# Patient Record
Sex: FEMALE | Race: WHITE | NOT HISPANIC OR LATINO | Employment: FULL TIME | URBAN - METROPOLITAN AREA
[De-identification: names, ages, dates, MRNs, and addresses within clinical notes are randomized per-mention and may not be internally consistent; named-entity substitution may affect disease eponyms.]

---

## 2017-03-13 ENCOUNTER — OFFICE VISIT (OUTPATIENT)
Dept: URGENT CARE | Facility: CLINIC | Age: 47
End: 2017-03-13

## 2017-03-13 PROCEDURE — 99213 OFFICE O/P EST LOW 20 MIN: CPT

## 2017-09-08 DIAGNOSIS — Z12.31 ENCOUNTER FOR SCREENING MAMMOGRAM FOR MALIGNANT NEOPLASM OF BREAST: ICD-10-CM

## 2017-09-08 DIAGNOSIS — N64.4 MASTODYNIA: ICD-10-CM

## 2017-10-06 ENCOUNTER — GENERIC CONVERSION - ENCOUNTER (OUTPATIENT)
Dept: OTHER | Facility: OTHER | Age: 47
End: 2017-10-06

## 2017-10-18 ENCOUNTER — GENERIC CONVERSION - ENCOUNTER (OUTPATIENT)
Dept: OTHER | Facility: OTHER | Age: 47
End: 2017-10-18

## 2017-10-18 LAB
A/G RATIO (HISTORICAL): 1.7 (ref 1.2–2.2)
ALBUMIN SERPL BCP-MCNC: 4.3 G/DL (ref 3.5–5.5)
ALP SERPL-CCNC: 86 IU/L (ref 39–117)
ALT SERPL W P-5'-P-CCNC: 8 IU/L (ref 0–32)
AST SERPL W P-5'-P-CCNC: 15 IU/L (ref 0–40)
BASOPHILS # BLD AUTO: 0 %
BASOPHILS # BLD AUTO: 0 X10E3/UL (ref 0–0.2)
BILIRUB SERPL-MCNC: 0.5 MG/DL (ref 0–1.2)
BUN SERPL-MCNC: 10 MG/DL (ref 6–24)
BUN/CREA RATIO (HISTORICAL): 13 (ref 9–23)
CALCIUM SERPL-MCNC: 8.9 MG/DL (ref 8.7–10.2)
CHLORIDE SERPL-SCNC: 101 MMOL/L (ref 96–106)
CHOLEST SERPL-MCNC: 132 MG/DL (ref 100–199)
CO2 SERPL-SCNC: 26 MMOL/L (ref 18–29)
CREAT SERPL-MCNC: 0.76 MG/DL (ref 0.57–1)
DEPRECATED RDW RBC AUTO: 13.8 % (ref 12.3–15.4)
EGFR AFRICAN AMERICAN (HISTORICAL): 107 ML/MIN/1.73
EGFR-AMERICAN CALC (HISTORICAL): 93 ML/MIN/1.73
EOSINOPHIL # BLD AUTO: 0.1 X10E3/UL (ref 0–0.4)
EOSINOPHIL # BLD AUTO: 2 %
GLUCOSE SERPL-MCNC: 93 MG/DL (ref 65–99)
HCT VFR BLD AUTO: 40.9 % (ref 34–46.6)
HDLC SERPL-MCNC: 54 MG/DL
HGB BLD-MCNC: 13.6 G/DL (ref 11.1–15.9)
IMM.GRANULOCYTES (CD4/8) (HISTORICAL): 0 %
IMM.GRANULOCYTES (CD4/8) (HISTORICAL): 0 X10E3/UL (ref 0–0.1)
LDLC SERPL CALC-MCNC: 63 MG/DL (ref 0–99)
LYMPHOCYTES # BLD AUTO: 2.4 X10E3/UL (ref 0.7–3.1)
LYMPHOCYTES # BLD AUTO: 27 %
MCH RBC QN AUTO: 28.8 PG (ref 26.6–33)
MCHC RBC AUTO-ENTMCNC: 33.3 G/DL (ref 31.5–35.7)
MCV RBC AUTO: 87 FL (ref 79–97)
MONOCYTES # BLD AUTO: 0.6 X10E3/UL (ref 0.1–0.9)
MONOCYTES (HISTORICAL): 7 %
NEUTROPHILS # BLD AUTO: 5.6 X10E3/UL (ref 1.4–7)
NEUTROPHILS # BLD AUTO: 64 %
PLATELET # BLD AUTO: 274 X10E3/UL (ref 150–379)
POTASSIUM SERPL-SCNC: 4.4 MMOL/L (ref 3.5–5.2)
RBC (HISTORICAL): 4.72 X10E6/UL (ref 3.77–5.28)
SODIUM SERPL-SCNC: 139 MMOL/L (ref 134–144)
TOT. GLOBULIN, SERUM (HISTORICAL): 2.5 G/DL (ref 1.5–4.5)
TOTAL PROTEIN (HISTORICAL): 6.8 G/DL (ref 6–8.5)
TRIGL SERPL-MCNC: 77 MG/DL (ref 0–149)
WBC # BLD AUTO: 8.8 X10E3/UL (ref 3.4–10.8)

## 2017-10-19 ENCOUNTER — ALLSCRIPTS OFFICE VISIT (OUTPATIENT)
Dept: OTHER | Facility: OTHER | Age: 47
End: 2017-10-19

## 2017-10-19 LAB
BACTERIA UR QL AUTO: NORMAL
BILIRUB UR QL STRIP: NEGATIVE
COLOR UR: YELLOW
COMMENT (HISTORICAL): CLEAR
FECAL OCCULT BLOOD DIAGNOSTIC (HISTORICAL): NEGATIVE
GLUCOSE (HISTORICAL): NEGATIVE
KETONES UR STRIP-MCNC: NEGATIVE MG/DL
LEUKOCYTE ESTERASE UR QL STRIP: NEGATIVE
MICROSCOPIC EXAMINATION (HISTORICAL): NORMAL
MICROSCOPIC EXAMINATION (HISTORICAL): NORMAL
MUCUS THREADS (HISTORICAL): PRESENT
NITRITE UR QL STRIP: NEGATIVE
NON-SQ EPI CELLS URNS QL MICRO: NORMAL /HPF
PH UR STRIP.AUTO: 6.5 [PH] (ref 5–7.5)
PROT UR STRIP-MCNC: NEGATIVE MG/DL
RBC (HISTORICAL): NORMAL /HPF
SP GR UR STRIP.AUTO: 1.02 (ref 1–1.03)
TSH SERPL DL<=0.05 MIU/L-ACNC: 1.74 UIU/ML (ref 0.45–4.5)
URINALYSIS (UA) (HISTORICAL): NORMAL
UROBILINOGEN UR QL STRIP.AUTO: 0.2 EU/DL (ref 0.2–1)
WBC # BLD AUTO: NORMAL /HPF

## 2017-10-23 ENCOUNTER — ALLSCRIPTS OFFICE VISIT (OUTPATIENT)
Dept: OTHER | Facility: OTHER | Age: 47
End: 2017-10-23

## 2017-10-23 DIAGNOSIS — E04.2 NONTOXIC MULTINODULAR GOITER: ICD-10-CM

## 2017-10-23 DIAGNOSIS — N28.1 ACQUIRED CYST OF KIDNEY: ICD-10-CM

## 2017-11-20 ENCOUNTER — HOSPITAL ENCOUNTER (OUTPATIENT)
Dept: ULTRASOUND IMAGING | Facility: CLINIC | Age: 47
Discharge: HOME | End: 2017-11-20
Payer: COMMERCIAL

## 2017-11-20 ENCOUNTER — HOSPITAL ENCOUNTER (OUTPATIENT)
Dept: MAMMOGRAPHY | Facility: CLINIC | Age: 47
Discharge: HOME | End: 2017-11-20
Payer: COMMERCIAL

## 2017-11-20 DIAGNOSIS — Z12.31 ENCOUNTER FOR SCREENING MAMMOGRAM FOR MALIGNANT NEOPLASM OF BREAST: ICD-10-CM

## 2017-11-20 DIAGNOSIS — N28.1 ACQUIRED CYST OF KIDNEY: ICD-10-CM

## 2017-11-20 DIAGNOSIS — N64.4 MASTODYNIA: ICD-10-CM

## 2017-11-20 DIAGNOSIS — E04.2 NONTOXIC MULTINODULAR GOITER: ICD-10-CM

## 2017-11-20 DIAGNOSIS — R92.8 ABNORMAL MAMMOGRAM OF LEFT BREAST: ICD-10-CM

## 2017-11-20 DIAGNOSIS — R92.8 ABNORMAL MAMMOGRAM OF RIGHT BREAST: ICD-10-CM

## 2017-11-20 PROCEDURE — 76642 ULTRASOUND BREAST LIMITED: CPT

## 2017-11-20 PROCEDURE — G0204 DX MAMMO INCL CAD BI: HCPCS

## 2017-11-20 PROCEDURE — 76536 US EXAM OF HEAD AND NECK: CPT

## 2017-11-20 PROCEDURE — 76770 US EXAM ABDO BACK WALL COMP: CPT

## 2017-11-20 PROCEDURE — G0279 TOMOSYNTHESIS, MAMMO: HCPCS

## 2017-11-24 ENCOUNTER — GENERIC CONVERSION - ENCOUNTER (OUTPATIENT)
Dept: OTHER | Facility: OTHER | Age: 47
End: 2017-11-24

## 2018-01-10 NOTE — MISCELLANEOUS
Message   Recorded as Task   Date: 11/22/2017 05:34 PM, Created By: Chris Mcintyre   Task Name: Go to Result   Assigned To: Kaci Salinas   Regarding Patient: Coleen Mendes, Status: Complete   Comment:    Chris Mcintyre - 22 Nov 2017 5:34 PM     TASK CREATED  Please let the patient know that there is no significant change in the thyroid US-  the nodeule is still too small to biopsy  We will plan on checking an US again in a year  Kaci Salinas - 24 Nov 2017 1:31 PM     TASK EDITED  patient informed at home number   Kaci Salinas - 24 Nov 2017 1:31 PM     TASK COMPLETED        Active Problems    1  Breast pain, left (611 71) (N64 4)   2  Herpes labialis (054 9) (B00 1)   3  Multiple thyroid nodules (241 1) (E04 2)   4  Neck pain on right side (723 1) (M54 2)   5  Occipital headache (784 0) (R51)   6  Renal cyst, acquired (593 2) (N28 1)    Current Meds   1  Multivitamins CHEW; CHEW AND SWALLOW 1 TABLET DAILY; Therapy: (Recorded:23Oct2017) to Recorded   2  Vitamin D3 1000 UNIT Oral Tablet Chewable; CHEW 1 TABLET DAILY; Therapy: (Recorded:23Oct2017) to Recorded    Allergies    1   Augmentin XR TB12    Signatures   Electronically signed by : Brandie Pepper, ; Nov 24 2017  1:31PM EST                       (Author)

## 2018-01-17 NOTE — PROGRESS NOTES
Assessment    1  Encounter for preventive health examination (V70 0) (Z00 00)    Plan  Breast pain, left, PMH: Screening for breast cancer    · MAMMO DIAGNOSTIC BILATERAL W CAD; Status:Active; Requested [id number]; Health Maintenance    · Begin a limited exercise program ; Status:Complete;   Done: 89AYJ2272   · Begin or continue regular aerobic exercise  Gradually work up to at least 3 sessions of 30  minutes of exercise a week ; Status:Complete;   Done: 82SZU9217   · Eat a low fat and low cholesterol diet ; Status:Complete;   Done: 25JTM7990   · Stretch and warm up your muscles during the first 10 minutes , then cool down your  muscles for the last 10 minutes of exercise ; Status:Complete;   Done: 11LPL8690   · We recommend that you bring your body mass index down to 26 ; Status:Complete;    Done: 76Eiq6588   · You need to quit smoking ; Status:Complete;   Done: 30KAD5189   · Follow-up visit in 1 year Evaluation and Treatment  Follow-up  Status: Complete  Done:  23Oct2017  Multiple thyroid nodules    · US THYROID; Status:Active; Requested [id number];   Renal cyst, acquired    · [address] [place]; Status:Active; Requested ZAO:[id number];     Discussion/Summary  health maintenance visit Currently, she eats a healthy diet and has an adequate exercise regimen  the risks and benefits of cervical cancer screening were discussed cervical cancer screening is current Breast cancer screening: the risks and benefits of breast cancer screening were discussed, self breast exam technique was taught and mammogram has been ordered  She was advised to be evaluated by an optometrist and a dentist  Advice and education were given regarding nutrition, weight bearing exercise, weight loss, vitamin D supplements, sunscreen use, self skin examination, seat belt use and advanced directive planning  The patient's lab work looked very good  She will work on her diet and exercise   We will see her again in a year for a physical    Possible side effects of new medications were reviewed with the patient/guardian today  The treatment plan was reviewed with the patient/guardian  The patient/guardian understands and agrees with the treatment plan      Chief Complaint  Complete Physical 48year old  History of Present Illness  HM, Adult Female: The patient is being seen for a health maintenance evaluation  General Health: The patient's health since the last visit is described as good  Lifestyle:  She consumes a diverse and healthy diet  She has weight concerns  She exercises regularly  She exercises 3 or more times per week  Exercise includes walking and Jogging  She does not use tobacco  She denies alcohol use  She denies drug use  Reproductive health: the patient is perimenopausal    Screening: cancer screening reviewed and current  metabolic screening reviewed and current  risk screening reviewed and current  HPI: Evgeny Barrera is a 69-year-old female presents today for a physical today  The patient denies any chest pain, shortness of breath, or palpitations  There is no edema  There are no headaches or visual changes  There is no lightheadedness, dizziness, or fainting spells  There are no GI symptoms  She admits to not watching her diet and not exercising as much  Review of Systems    Constitutional: as noted in HPI  Eyes: as noted in HPI    ENT: as noted in HPI  Cardiovascular: as noted in HPI  Respiratory: as noted in HPI  Gastrointestinal: as noted in HPI  Genitourinary: as noted in HPI  Musculoskeletal: as noted in HPI  Integumentary: as noted in HPI  Active Problems    1  Breast pain, left (611 71) (N64 4)   2  Herpes labialis (054 9) (B00 1)   3  Multiple thyroid nodules (241 1) (E04 2)   4  Neck pain on right side (723 1) (M54 2)   5  Occipital headache (784 0) (R51)   6   Renal cyst, acquired (593 2) (N28 1)    Past Medical History    · History of Acute back pain (724 5) (M54 9) · History of Acute intractable tension-type headache (339 10) (G44 201)   · History of Acute maxillary sinusitis (461 0) (J01 00)   · History of Acute pansinusitis, recurrence not specified (461 8) (J01 40)   · History of Acute viral pharyngitis (462) (J02 8,B97 89)   · History of Antibiotic-associated diarrhea (787 91,E930 9) (K52 1,T36 95XA)   · History of Arthralgia of left temporomandibular joint (524 62) (O37 275)   · History of Back Pain   · History of Blood tests for routine general physical examination (V72 62) (Z00 00)   · History of Chest pain on respiration (786 52) (R07 1)   · History of Contusion of rib on left side (922 1) (Y33 997D)   · History of Contusion Of The Left Breast With Intact Skin Surface (922 0)   · History of Cough (786 2) (R05)   · History of Cough (786 2) (R05)   · History of Hip Sprain (843 9)   · History of acute bronchitis (V12 69) (Z87 09)   · History of acute bronchitis (V12 69) (Z87 09)   · History of acute otitis externa (V12 49) (Z86 69)   · History of acute sinusitis (V12 69) (Z87 09)   · History of Neck pain (723 1) (M54 2)   · History of Neck sprain and strain (847 0) (S13  9XXA,S16  1XXA)   · History of Rhinitis (472 0) (J31 0)   · History of Sprained Ribs (848 3)   · History of Thoracic sprain (847 1) (S23 9XXA)    Surgical History    · History of  Section   · History of Neck Excision Of Soft Tissue Tumor    Family History  Mother    · Family history of carcinoid tumor (V16 9) (Z80 9)   · Family history of malignant neoplasm of breast (V16 3) (Z80 3)   · Family history of malignant neoplasm of kidney (V16 51) (Z80 51)   · Family history of S/P colon resection  Maternal Grandmother    · Family history of cardiac disorder (V17 49) (Z82 49)   · Family history of malignant neoplasm of breast (V16 3) (Z80 3)  Maternal Grandfather    · Family history of carcinoid tumor (V16 9) (Z80 9)  Paternal Grandfather    · Family history of cardiac disorder (V17 49) (Z82 49)    Social History    · Always uses helmet   ·    · Minimum alcohol consumption   · Never a smoker   · No drug use   · Occupation   · Sterile Processing Technician  · Two children    Current Meds   1  Multivitamins CHEW; CHEW AND SWALLOW 1 TABLET DAILY; Therapy: (Recorded:23Oct2017) to Recorded   2  Vitamin D3 1000 UNIT Oral Tablet Chewable; CHEW 1 TABLET DAILY; Therapy: (Recorded:23Oct2017) to Recorded    Allergies    1  Augmentin XR TB12    Vitals   Recorded: 11CDU1440 05:06PM Recorded: 83PFG0492 04:33PM   Heart Rate 68 64   Systolic 202 180   Diastolic 78 92   Height  5 ft 7 in   Weight  201 lb    BMI Calculated  31 48   BSA Calculated  2 03     Physical Exam    Constitutional   General appearance: No acute distress, well appearing and well nourished  Eyes   Conjunctiva and lids: No swelling, erythema or discharge  Pupils and irises: Equal, round, reactive to light  Ophthalmoscopic examination: Normal fundi and optic discs  Ears, Nose, Mouth, and Throat   External inspection of ears and nose: Normal     Otoscopic examination: Tympanic membranes translucent with normal light reflex  Canals patent without erythema  Hearing: Normal     Nasal mucosa, septum, and turbinates: Normal without edema or erythema  Lips, teeth, and gums: Normal, good dentition  Oropharynx: Normal with no erythema, edema, exudate or lesions  Neck   Neck: Supple, symmetric, trachea midline, no masses  Thyroid: Normal, no thyromegaly  Pulmonary   Respiratory effort: No increased work of breathing or signs of respiratory distress  Percussion of chest: Normal     Palpation of chest: Normal     Auscultation of lungs: Clear to auscultation  Cardiovascular   Palpation of heart: Normal PMI, no thrills  Auscultation of heart: Normal rate and rhythm, normal S1 and S2, no murmurs  Carotid pulses: 2+ bilaterally  Abdominal aorta: Normal     Femoral pulses: 2+ bilaterally  Pedal pulses: 2+ bilaterally  Examination of extremities for edema and/or varicosities: Normal     Chest   Breasts: Normal, no dimpling or skin changes appreciated  Palpation of breasts and axillae: Normal, no masses palpated  Abdomen   Abdomen: Non-tender, no masses  Liver and spleen: No hepatomegaly or splenomegaly  Lymphatic   Palpation of lymph nodes in neck: No lymphadenopathy  Palpation of lymph nodes in axillae: No lymphadenopathy  Musculoskeletal   Gait and station: Normal     Digits and nails: Normal without clubbing or cyanosis  Joints, bones, and muscles: Normal     Range of motion: Normal     Stability: Normal     Muscle strength/tone: Normal     Skin   Skin and subcutaneous tissue: Normal without rashes or lesions  Palpation of skin and subcutaneous tissue: Normal turgor  Neurologic   Cranial nerves: Cranial nerves II-XII intact  Reflexes: 2+ and symmetric  Sensation: No sensory loss  Psychiatric   Judgment and insight: Normal     Orientation to person, place, and time: Normal     Recent and remote memory: Intact  Mood and affect: Normal        Results/Data  (1) CBC/PLT/DIFF [id number] 08:11AM [employer]     Test Name Result Flag Reference   WBC 8 8 x10E3/uL  3 4-10 8   RBC 4 72 x10E6/uL  3 77-5 28   Hemoglobin 13 6 g/dL  11 1-15 9   Hematocrit 40 9 %  34 0-46  6   MCV 87 fL  79-97   MCH 28 8 pg  26 6-33 0   MCHC 33 3 g/dL  31 5-35 7   RDW 13 8 %  12 3-15 4   Platelets 785 S72N4/EA  150-379   Neutrophils 64 %  Not Estab  Lymphs 27 %  Not Estab  Monocytes 7 %  Not Estab  Eos 2 %  Not Estab  Basos 0 %  Not Estab  Neutrophils (Absolute) 5 6 x10E3/uL  1 4-7 0   Lymphs (Absolute) 2 4 x10E3/uL  0 7-3 1   Monocytes(Absolute) 0 6 x10E3/uL  0 1-0 9   Eos (Absolute) 0 1 x10E3/uL  0 0-0 4   Baso (Absolute) 0 0 x10E3/uL  0 0-0 2   Immature Granulocytes 0 %  Not Estab     Immature Grans (Abs) 0 0 x10E3/uL  0 0-0 1     (1) COMPREHENSIVE METABOLIC PANEL [id number] 86:52KK [employer]     Test Name Result Flag Reference   Glucose, Serum 93 mg/dL  65-99   BUN 10 mg/dL  6-24   Creatinine, Serum 0 76 mg/dL  0 57-1 00   BUN/Creatinine Ratio 13  9-23   Sodium, Serum 139 mmol/L  134-144   Potassium, Serum 4 4 mmol/L  3 5-5 2   Chloride, Serum 101 mmol/L     Carbon Dioxide, Total 26 mmol/L  18-29   Calcium, Serum 8 9 mg/dL  8 7-10 2   Protein, Total, Serum 6 8 g/dL  6 0-8 5   Albumin, Serum 4 3 g/dL  3 5-5 5   Globulin, Total 2 5 g/dL  1 5-4 5   A/G Ratio 1 7  1 2-2 2   Bilirubin, Total 0 5 mg/dL  0 0-1 2   Alkaline Phosphatase, S 86 IU/L     AST (SGOT) 15 IU/L  0-40   ALT (SGPT) 8 IU/L  0-32   eGFR If NonAfricn Am 93 mL/min/1 73  >59   eGFR If Africn Am 107 mL/min/1 73  >59     (1) LIPID PANEL FASTING W DIRECT LDL REFLEX [id number] 08:11AM [employer]     Test Name Result Flag Reference   Cholesterol, Total 132 mg/dL  100-199   Triglycerides 77 mg/dL  0-149   HDL Cholesterol 54 mg/dL  >39   LDL Cholesterol Calc 63 mg/dL  0-99     (1) TSH [id number] 08:11AM [employer]     Test Name Result Flag Reference   TSH 1 740 uIU/mL  0 450-4 500     [facility]) UA/M w/rflx Culture, Comp [id number] 08:11AM [employer]     Test Name Result Flag Reference   Specific Gravity 1 020  1 005-1 030   pH 6 5  5 0-7 5   Urine-Color Yellow  Yellow   Appearance Clear  Clear   WBC Esterase Negative  Negative   Protein Negative  Negative/Trace   Glucose Negative  Negative   Ketones Negative  Negative   Occult Blood Negative  Negative   Bilirubin Negative  Negative   Urobilinogen,Semi-Qn 0 2 EU/dL  0 2-1 0   Nitrite, Urine Negative  Negative   Microscopic Examination Comment     Microscopic follows if indicated  Microscopic Examination See below:     Urinalysis Reflex Comment     This specimen will not reflex to a Urine Culture  WBC 0-5 /hpf  0 -  5   RBC 0-2 /hpf  0 -  2   Epithelial Cells (non renal) 0-10 /hpf  0 - 10   Mucus Threads Present  Not Estab     Bacteria Few  None seen/Few       Procedure    Procedure: Visual Acuity Test  Indication: routine screening  Inforrmation supplied by a Snellen chart  Results: 20/25 in the right eye with corrective device, 20/20 in the left eye with corrective device Wears glasses, saw her eye doctor and is doing well  The patient was cooperative        Future Appointments    Date/Time Provider Specialty Site   10/25/2018 05:00 PM Zeinab Herman DO Family Medicine [facility]     Signatures   Electronically signed by : Eileen Agudelo DO; Oct 25 2017 12:47AM EST                       (Author)

## 2018-01-22 VITALS
WEIGHT: 201 LBS | HEART RATE: 68 BPM | BODY MASS INDEX: 31.55 KG/M2 | SYSTOLIC BLOOD PRESSURE: 134 MMHG | HEIGHT: 67 IN | DIASTOLIC BLOOD PRESSURE: 78 MMHG

## 2018-05-09 ENCOUNTER — OFFICE VISIT (OUTPATIENT)
Dept: URGENT CARE | Facility: CLINIC | Age: 48
End: 2018-05-09
Payer: COMMERCIAL

## 2018-05-09 VITALS
RESPIRATION RATE: 18 BRPM | DIASTOLIC BLOOD PRESSURE: 92 MMHG | BODY MASS INDEX: 32.49 KG/M2 | TEMPERATURE: 99.4 F | SYSTOLIC BLOOD PRESSURE: 140 MMHG | OXYGEN SATURATION: 98 % | WEIGHT: 207 LBS | HEART RATE: 83 BPM | HEIGHT: 67 IN

## 2018-05-09 DIAGNOSIS — R05.9 COUGH: Primary | ICD-10-CM

## 2018-05-09 PROCEDURE — 99213 OFFICE O/P EST LOW 20 MIN: CPT | Performed by: PREVENTIVE MEDICINE

## 2018-05-09 RX ORDER — PROMETHAZINE HYDROCHLORIDE AND CODEINE PHOSPHATE 6.25; 1 MG/5ML; MG/5ML
5 SYRUP ORAL EVERY 4 HOURS PRN
Qty: 120 ML | Refills: 0 | Status: SHIPPED | OUTPATIENT
Start: 2018-05-09 | End: 2020-11-16 | Stop reason: ALTCHOICE

## 2018-05-09 NOTE — PROGRESS NOTES
[facility]        NAME: Tanika Mcknight is a 50 y o  female  : 1970    MRN: [id number]  DATE: May 9, 2018  TIME: 7:27 PM    Assessment and Plan   Cough [R05]  1  Cough  promethazine-codeine (PHENERGAN WITH CODEINE) 6 25-10 mg/5 mL syrup         Patient Instructions       Follow up with PCP in 3-5 days  Proceed to  ER if symptoms worsen  Chief Complaint     Chief Complaint   Patient presents with   Isidra Soledad Like Symptoms     Pt reports on  she developed a cough, HA, diarrhea & itchy throat  History of Present Illness       Cough x3 or 4 days, so much so that is keeping her up at night  No fever but she does feel itchy in her throat  Review of Systems   Review of Systems   HENT: Positive for congestion  Respiratory: Positive for cough  Current Medications       Current Outpatient Prescriptions:     promethazine-codeine (PHENERGAN WITH CODEINE) 6 25-10 mg/5 mL syrup, Take 5 mL by mouth every 4 (four) hours as needed for cough, Disp: 120 mL, Rfl: 0    Current Allergies     Allergies as of 2018 - Reviewed 2018   Allergen Reaction Noted    Amoxicillin-pot clavulanate Diarrhea 2015            The following portions of the patient's history were reviewed and updated as appropriate: allergies, current medications, past family history, past medical history, past social history, past surgical history and problem list      Past Medical History:   Diagnosis Date    Rhinitis     Last Assessed: 14       Past Surgical History:   Procedure Laterality Date     SECTION      07 and 08    TUMOR EXCISION  2015    Neck Excision of Soft Tissue Tumor, 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         Family History   Problem Relation Age of Onset    Other Mother      carcinoid tumor, S/P colon resection    Breast cancer Mother     Kidney cancer Mother     Heart disease Maternal Grandmother      cardiac disorder    Breast cancer Maternal Grandmother     Other Maternal Grandfather      carcinoid tumor    Heart disease Paternal Grandfather      cardiac disorder         Medications have been verified  Objective   /92   Pulse 83   Temp 99 4 °F (37 4 °C)   Resp 18   Ht 5' 7" (1 702 m)   Wt 93 9 kg (207 lb)   SpO2 98%   BMI 32 42 kg/m²        Physical Exam     Physical Exam   HENT:   Right Ear: External ear normal    Left Ear: External ear normal    Mouth/Throat: Oropharynx is clear and moist    Cardiovascular: Normal heart sounds  Pulmonary/Chest: Breath sounds normal    Lymphadenopathy:     She has no cervical adenopathy

## 2018-05-09 NOTE — PATIENT INSTRUCTIONS
I suspect that this is allergy related  Try taking 1 Zyrtec tablet per day  You can use a codeine cough medicine at night

## 2019-04-25 DIAGNOSIS — Z00.00 HEALTHCARE MAINTENANCE: Primary | ICD-10-CM

## 2019-04-25 DIAGNOSIS — Z13.1 SCREENING FOR DIABETES MELLITUS: ICD-10-CM

## 2019-04-25 DIAGNOSIS — Z11.4 SCREENING FOR HIV (HUMAN IMMUNODEFICIENCY VIRUS): ICD-10-CM

## 2019-04-25 DIAGNOSIS — Z13.6 SCREENING FOR CARDIOVASCULAR CONDITION: ICD-10-CM

## 2019-07-05 ENCOUNTER — OFFICE VISIT (OUTPATIENT)
Dept: URGENT CARE | Facility: CLINIC | Age: 49
End: 2019-07-05
Payer: COMMERCIAL

## 2019-07-05 ENCOUNTER — APPOINTMENT (OUTPATIENT)
Dept: RADIOLOGY | Facility: CLINIC | Age: 49
End: 2019-07-05
Payer: COMMERCIAL

## 2019-07-05 VITALS
WEIGHT: 210 LBS | HEART RATE: 74 BPM | OXYGEN SATURATION: 99 % | RESPIRATION RATE: 18 BRPM | TEMPERATURE: 99.3 F | BODY MASS INDEX: 32.96 KG/M2 | DIASTOLIC BLOOD PRESSURE: 76 MMHG | HEIGHT: 67 IN | SYSTOLIC BLOOD PRESSURE: 122 MMHG

## 2019-07-05 DIAGNOSIS — M25.571 RIGHT ANKLE PAIN, UNSPECIFIED CHRONICITY: ICD-10-CM

## 2019-07-05 DIAGNOSIS — M25.571 RIGHT ANKLE PAIN, UNSPECIFIED CHRONICITY: Primary | ICD-10-CM

## 2019-07-05 PROCEDURE — 73610 X-RAY EXAM OF ANKLE: CPT

## 2019-07-05 PROCEDURE — 99213 OFFICE O/P EST LOW 20 MIN: CPT | Performed by: PHYSICIAN ASSISTANT

## 2019-07-05 PROCEDURE — 73630 X-RAY EXAM OF FOOT: CPT

## 2019-07-05 NOTE — PATIENT INSTRUCTIONS
Continue with OTC ibuprofen (max daily dose = 3200 mg)  Recommend rest, ice, compression, and elevation  Follow up with PCP in 3-5 days  Go to ER if chest pain or difficulty breathing

## 2019-07-05 NOTE — PROGRESS NOTES
Assessment/Plan    Right ankle pain, unspecified chronicity [M25 571]  1  Right ankle pain, unspecified chronicity  XR foot 3+ vw right    XR ankle 3+ vw right     Continue with OTC ibuprofen  Recommend rest, ice, compression, and elevation  Follow up with PCP in 3-5 days  If no improvement in 4-5 days, orthopedics contact given  Go to ER if chest pain or difficulty breathing    Subjective:     Patient ID: Stephie Kirby is a 46 y o  female  Reason For Visit / Chief Complaint  Chief Complaint   Patient presents with    Ankle Pain     pt has a hx of right ankle pain  two days ago the pt was jogging and the right ankle became painful and swollen  Patient presents with complaint of right ankle and foot pain x 2 days  Pt states that she has a history of right ankle pain and swelling x 1 year that was attributed to arthritis by her PCP but has never been x-rayed  Pt states that she has gained weight over the past year but has recently been exercising 3x a week, worsening the ankle pain  Pt states that she jogged for 45 minutes on Wednesday and had severe pain and swelling afterwards  Pt states that she has been icing and taking ibuprofen but has done neither today  Pt rates her pain as a 9/10 and describes the pain as throbbing and worse medially  Pt denies numbness, tingling, chest pain, SOB, fever, chills, and weakness  Pt denies any trauma to the ankle  Past Medical History:   Diagnosis Date    Rhinitis     Last Assessed: 14       Past Surgical History:   Procedure Laterality Date     SECTION      07 and 08    TUMOR EXCISION  2015    Neck Excision of Soft Tissue Tumor, 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         Family History   Problem Relation Age of Onset    Other Mother         carcinoid tumor, S/P colon resection    Breast cancer Mother     Kidney cancer Mother     Heart disease Maternal Grandmother         cardiac disorder    Breast cancer Maternal Grandmother  Other Maternal Grandfather         carcinoid tumor    Heart disease Paternal Grandfather         cardiac disorder       Review of Systems   Constitutional: Negative for chills, fatigue and fever  Respiratory: Negative for cough, chest tightness, shortness of breath and wheezing  Cardiovascular: Negative for chest pain and palpitations  Musculoskeletal: Positive for gait problem and joint swelling  Negative for myalgias  Skin: Negative for color change, rash and wound  Neurological: Negative for dizziness, weakness, numbness and headaches  All other systems reviewed and are negative  Objective:    /76   Pulse 74   Temp 99 3 °F (37 4 °C)   Resp 18   Ht 5' 7" (1 702 m)   Wt 95 3 kg (210 lb)   SpO2 99%   BMI 32 89 kg/m²     Physical Exam   Constitutional: She is oriented to person, place, and time  She appears well-developed and well-nourished  No distress  HENT:   Head: Normocephalic and atraumatic  Eyes: Pupils are equal, round, and reactive to light  Conjunctivae and EOM are normal    Neck: Normal range of motion  Neck supple  Cardiovascular: Normal rate, regular rhythm and normal heart sounds  Pulmonary/Chest: Effort normal and breath sounds normal  No respiratory distress  She has no wheezes  Musculoskeletal:   Right ankle/foot: edematous over medial and lateral malleoli; mild medial ecchymosis; AROM limited d/t pain; unable to assess strength d/t pain; TTP over medial malleoli, "tingling" reported with palpation over foot; sensation intact; cap refill <2   Lymphadenopathy:     She has no cervical adenopathy  Neurological: She is alert and oriented to person, place, and time  No cranial nerve deficit or sensory deficit  Normal gait   Skin: Skin is warm and dry  Capillary refill takes less than 2 seconds  No rash noted  She is not diaphoretic  Psychiatric: She has a normal mood and affect   Her behavior is normal  Thought content normal    Nursing note and vitals reviewed

## 2020-05-01 ENCOUNTER — TELEPHONE (OUTPATIENT)
Dept: FAMILY MEDICINE CLINIC | Facility: CLINIC | Age: 50
End: 2020-05-01

## 2020-06-20 ENCOUNTER — TRANSCRIBE ORDERS (OUTPATIENT)
Dept: ADMINISTRATIVE | Facility: HOSPITAL | Age: 50
End: 2020-06-20

## 2020-06-20 DIAGNOSIS — Z12.31 SCREENING MAMMOGRAM FOR HIGH-RISK PATIENT: Primary | ICD-10-CM

## 2020-10-14 ENCOUNTER — HOSPITAL ENCOUNTER (OUTPATIENT)
Dept: MAMMOGRAPHY | Facility: CLINIC | Age: 50
Discharge: HOME | End: 2020-10-14
Payer: COMMERCIAL

## 2020-10-14 VITALS — BODY MASS INDEX: 32.96 KG/M2 | TEMPERATURE: 96.4 F | HEIGHT: 67 IN | WEIGHT: 210 LBS

## 2020-10-14 DIAGNOSIS — Z12.31 SCREENING MAMMOGRAM FOR HIGH-RISK PATIENT: ICD-10-CM

## 2020-10-14 DIAGNOSIS — Z12.31 ENCOUNTER FOR SCREENING MAMMOGRAM FOR MALIGNANT NEOPLASM OF BREAST: ICD-10-CM

## 2020-10-14 PROCEDURE — 77063 BREAST TOMOSYNTHESIS BI: CPT

## 2020-10-14 PROCEDURE — 77067 SCR MAMMO BI INCL CAD: CPT

## 2020-11-16 ENCOUNTER — APPOINTMENT (OUTPATIENT)
Dept: RADIOLOGY | Facility: CLINIC | Age: 50
End: 2020-11-16
Payer: COMMERCIAL

## 2020-11-16 ENCOUNTER — OFFICE VISIT (OUTPATIENT)
Dept: URGENT CARE | Facility: CLINIC | Age: 50
End: 2020-11-16
Payer: COMMERCIAL

## 2020-11-16 ENCOUNTER — APPOINTMENT (OUTPATIENT)
Dept: URGENT CARE | Facility: CLINIC | Age: 50
End: 2020-11-16
Payer: COMMERCIAL

## 2020-11-16 VITALS
DIASTOLIC BLOOD PRESSURE: 84 MMHG | WEIGHT: 218 LBS | HEART RATE: 89 BPM | BODY MASS INDEX: 34.21 KG/M2 | HEIGHT: 67 IN | SYSTOLIC BLOOD PRESSURE: 118 MMHG | OXYGEN SATURATION: 98 % | RESPIRATION RATE: 20 BRPM | TEMPERATURE: 97.7 F

## 2020-11-16 DIAGNOSIS — S99.921A INJURY OF RIGHT FOOT, INITIAL ENCOUNTER: ICD-10-CM

## 2020-11-16 DIAGNOSIS — S92.354A CLOSED NONDISPLACED FRACTURE OF FIFTH METATARSAL BONE OF RIGHT FOOT, INITIAL ENCOUNTER: Primary | ICD-10-CM

## 2020-11-16 PROCEDURE — 73630 X-RAY EXAM OF FOOT: CPT

## 2020-11-16 PROCEDURE — 73610 X-RAY EXAM OF ANKLE: CPT

## 2020-11-16 PROCEDURE — 99213 OFFICE O/P EST LOW 20 MIN: CPT | Performed by: PHYSICIAN ASSISTANT

## 2020-11-16 RX ORDER — MELATONIN
1000 DAILY
COMMUNITY

## 2020-11-16 RX ORDER — BIOTIN 10000 MCG
10 CAPSULE ORAL
COMMUNITY

## 2020-11-16 RX ORDER — MULTIVITAMIN WITH IRON
TABLET ORAL DAILY
COMMUNITY

## 2020-12-10 ENCOUNTER — APPOINTMENT (OUTPATIENT)
Dept: RADIOLOGY | Facility: CLINIC | Age: 50
End: 2020-12-10
Payer: COMMERCIAL

## 2020-12-10 ENCOUNTER — TRANSCRIBE ORDERS (OUTPATIENT)
Dept: ADMINISTRATIVE | Facility: HOSPITAL | Age: 50
End: 2020-12-10

## 2020-12-10 DIAGNOSIS — M79.671 RIGHT FOOT PAIN: Primary | ICD-10-CM

## 2020-12-10 DIAGNOSIS — M79.671 RIGHT FOOT PAIN: ICD-10-CM

## 2020-12-10 PROCEDURE — 73630 X-RAY EXAM OF FOOT: CPT

## 2020-12-30 ENCOUNTER — TRANSCRIBE ORDERS (OUTPATIENT)
Dept: ADMINISTRATIVE | Facility: HOSPITAL | Age: 50
End: 2020-12-30

## 2020-12-30 ENCOUNTER — APPOINTMENT (OUTPATIENT)
Dept: RADIOLOGY | Facility: CLINIC | Age: 50
End: 2020-12-30
Payer: COMMERCIAL

## 2020-12-30 DIAGNOSIS — S92.354D CLOSED NONDISPLACED FRACTURE OF FIFTH METATARSAL BONE OF RIGHT FOOT WITH ROUTINE HEALING, SUBSEQUENT ENCOUNTER: ICD-10-CM

## 2020-12-30 DIAGNOSIS — S92.354D CLOSED NONDISPLACED FRACTURE OF FIFTH METATARSAL BONE OF RIGHT FOOT WITH ROUTINE HEALING, SUBSEQUENT ENCOUNTER: Primary | ICD-10-CM

## 2020-12-30 PROCEDURE — 73630 X-RAY EXAM OF FOOT: CPT

## 2021-10-18 ENCOUNTER — HOSPITAL ENCOUNTER (OUTPATIENT)
Dept: MAMMOGRAPHY | Facility: IMAGING CENTER | Age: 51
Discharge: HOME | End: 2021-10-18
Payer: COMMERCIAL

## 2021-10-18 VITALS — WEIGHT: 218 LBS | BODY MASS INDEX: 34.21 KG/M2 | HEIGHT: 67 IN

## 2021-10-18 DIAGNOSIS — Z12.31 ENCOUNTER FOR SCREENING MAMMOGRAM FOR MALIGNANT NEOPLASM OF BREAST: ICD-10-CM

## 2021-10-18 PROCEDURE — 77063 BREAST TOMOSYNTHESIS BI: CPT

## 2021-10-18 PROCEDURE — 77067 SCR MAMMO BI INCL CAD: CPT

## 2022-06-03 ENCOUNTER — TRANSCRIBE ORDERS (OUTPATIENT)
Dept: URGENT CARE | Facility: CLINIC | Age: 52
End: 2022-06-03

## 2022-06-03 ENCOUNTER — APPOINTMENT (OUTPATIENT)
Dept: RADIOLOGY | Facility: CLINIC | Age: 52
End: 2022-06-03
Payer: COMMERCIAL

## 2022-06-03 DIAGNOSIS — Z71.3 DIETARY COUNSELING: ICD-10-CM

## 2022-06-03 DIAGNOSIS — M25.552 LEFT HIP PAIN: ICD-10-CM

## 2022-06-03 DIAGNOSIS — M25.561 RIGHT KNEE PAIN, UNSPECIFIED CHRONICITY: ICD-10-CM

## 2022-06-03 DIAGNOSIS — Z71.3 DIETARY COUNSELING: Primary | ICD-10-CM

## 2022-06-03 PROCEDURE — 73562 X-RAY EXAM OF KNEE 3: CPT

## 2022-06-03 PROCEDURE — 73502 X-RAY EXAM HIP UNI 2-3 VIEWS: CPT

## 2022-10-26 ENCOUNTER — HOSPITAL ENCOUNTER (OUTPATIENT)
Dept: MAMMOGRAPHY | Facility: CLINIC | Age: 52
Discharge: HOME | End: 2022-10-26
Payer: COMMERCIAL

## 2022-10-26 VITALS — WEIGHT: 214 LBS | HEIGHT: 67 IN | BODY MASS INDEX: 33.59 KG/M2

## 2022-10-26 DIAGNOSIS — Z12.31 ENCOUNTER FOR SCREENING MAMMOGRAM FOR MALIGNANT NEOPLASM OF BREAST: ICD-10-CM

## 2022-10-26 PROCEDURE — 77063 BREAST TOMOSYNTHESIS BI: CPT

## 2022-10-26 PROCEDURE — 77067 SCR MAMMO BI INCL CAD: CPT

## 2023-12-23 ENCOUNTER — HOSPITAL ENCOUNTER (OUTPATIENT)
Dept: MAMMOGRAPHY | Facility: CLINIC | Age: 53
Discharge: HOME | End: 2023-12-23
Payer: COMMERCIAL

## 2023-12-23 VITALS — WEIGHT: 213.85 LBS | HEIGHT: 67 IN | BODY MASS INDEX: 33.56 KG/M2

## 2023-12-23 DIAGNOSIS — Z12.31 ENCOUNTER FOR SCREENING MAMMOGRAM FOR MALIGNANT NEOPLASM OF BREAST: ICD-10-CM

## 2023-12-23 PROCEDURE — 77063 BREAST TOMOSYNTHESIS BI: CPT

## 2023-12-23 PROCEDURE — 77067 SCR MAMMO BI INCL CAD: CPT

## 2024-01-26 ENCOUNTER — OCCMED (OUTPATIENT)
Dept: URGENT CARE | Facility: CLINIC | Age: 54
End: 2024-01-26
Payer: COMMERCIAL

## 2024-01-26 ENCOUNTER — APPOINTMENT (OUTPATIENT)
Dept: LAB | Facility: CLINIC | Age: 54
End: 2024-01-26
Payer: COMMERCIAL

## 2024-01-26 DIAGNOSIS — Z02.1 PHYSICAL EXAM, PRE-EMPLOYMENT: Primary | ICD-10-CM

## 2024-01-26 DIAGNOSIS — Z02.1 PHYSICAL EXAM, PRE-EMPLOYMENT: ICD-10-CM

## 2024-01-26 LAB
MEV IGG SER QL IA: NORMAL
MUV IGG SER QL IA: ABNORMAL
RUBV IGG SERPL IA-ACNC: 41.1 IU/ML
VZV IGG SER QL IA: NORMAL

## 2024-01-26 PROCEDURE — 86762 RUBELLA ANTIBODY: CPT

## 2024-01-26 PROCEDURE — 86765 RUBEOLA ANTIBODY: CPT

## 2024-01-26 PROCEDURE — 86735 MUMPS ANTIBODY: CPT

## 2024-01-26 PROCEDURE — 86787 VARICELLA-ZOSTER ANTIBODY: CPT

## 2024-01-26 PROCEDURE — 86480 TB TEST CELL IMMUN MEASURE: CPT

## 2024-01-26 PROCEDURE — 36415 COLL VENOUS BLD VENIPUNCTURE: CPT

## 2024-01-27 LAB
GAMMA INTERFERON BACKGROUND BLD IA-ACNC: <0 IU/ML
M TB IFN-G BLD-IMP: NEGATIVE
M TB IFN-G CD4+ BCKGRND COR BLD-ACNC: 0 IU/ML
M TB IFN-G CD4+ BCKGRND COR BLD-ACNC: 0 IU/ML
MITOGEN IGNF BCKGRD COR BLD-ACNC: 10 IU/ML

## 2025-05-27 ENCOUNTER — TELEPHONE (OUTPATIENT)
Age: 55
End: 2025-05-27

## 2025-05-27 ENCOUNTER — PREP FOR PROCEDURE (OUTPATIENT)
Age: 55
End: 2025-05-27

## 2025-05-27 DIAGNOSIS — Z12.11 SCREENING FOR COLON CANCER: Primary | ICD-10-CM

## 2025-05-27 NOTE — TELEPHONE ENCOUNTER
05/27/25  Screened by: Corry Hitchcock    Referring Provider     Pre- Screening:     BMI 31   Has patient been referred for a routine screening Colonoscopy? yes  Is the patient between 45-75 years old? yes      Previous Colonoscopy no   If yes:    Date:     Facility:     Reason:           Does the patient want to see a Gastroenterologist prior to their procedure OR are they having any GI symptoms? no    Has the patient been hospitalized or had abdominal surgery in the past 6 months? no    Does the patient use supplemental oxygen? no    Does the patient take Coumadin, Lovenox, Plavix, Elliquis, Xarelto, or other blood thinning medication? no    Has the patient had a stroke, cardiac event, or stent placed in the past year? no      If patient is between 45yrs - 49yrs, please advise patient that we will have to confirm benefits & coverage with their insurance company for a routine screening colonoscopy.

## 2025-06-03 ENCOUNTER — HOSPITAL ENCOUNTER (OUTPATIENT)
Dept: MAMMOGRAPHY | Facility: CLINIC | Age: 55
Discharge: HOME | End: 2025-06-03
Attending: FAMILY MEDICINE
Payer: COMMERCIAL

## 2025-06-03 VITALS — BODY MASS INDEX: 33.43 KG/M2 | HEIGHT: 67 IN | WEIGHT: 213 LBS

## 2025-06-03 DIAGNOSIS — Z12.31 ENCOUNTER FOR SCREENING MAMMOGRAM FOR MALIGNANT NEOPLASM OF BREAST: ICD-10-CM

## 2025-06-03 PROCEDURE — 77067 SCR MAMMO BI INCL CAD: CPT

## 2025-06-03 PROCEDURE — 77063 BREAST TOMOSYNTHESIS BI: CPT

## 2025-06-26 ENCOUNTER — TELEPHONE (OUTPATIENT)
Age: 55
End: 2025-06-26

## 2025-06-26 NOTE — TELEPHONE ENCOUNTER
Scheduled date of colonoscopy (as of today): 8/25/25  Physician performing colonoscopy: Dr Ogden  Location of colonoscopy: [facility]  Bowel prep reviewed with patient: pt rescheduled and she has prep instructions   Instructions reviewed with patient by: N/A  Clearances: N/A